# Patient Record
(demographics unavailable — no encounter records)

---

## 2024-10-23 NOTE — DATA REVIEWED
[de-identified] : Right wrist radiographs were obtained from [facility] and independently reviewed in clinic on 10/23/2024 depicting nondisplaced distal radius fracture with mild angulation noted which is acceptable alignment for his age. Signs of interval healing noted. Skeletally immature individual.
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
## 2024-10-23 NOTE — DATA REVIEWED
[de-identified] : Right wrist radiographs were obtained from [facility] and independently reviewed in clinic on 10/23/2024 depicting nondisplaced distal radius fracture with mild angulation noted which is acceptable alignment for his age. Signs of interval healing noted. Skeletally immature individual.
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
## 2024-10-23 NOTE — DEVELOPMENTAL MILESTONES
[Roll Over: ___ Months] : Roll Over: [unfilled] months [Sit Up: ___ Months] : Sit Up: [unfilled] months [Pull Self to Stand ___ Months] : Pull self to stand: [unfilled] months [Walk ___ Months] : Walk: [unfilled] months [Right] : right [FreeTextEntry2] : no
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
## 2024-10-23 NOTE — PHYSICAL EXAM
[FreeTextEntry1] : Gait: Presents ambulating independently without signs of antalgia. Good coordination and balance noted. GENERAL: alert, cooperative, in NAD SKIN: The skin is intact, warm, pink and dry over the area examined. EYES: Normal conjunctiva, normal eyelids and pupils were equal and round. ENT: normal ears, normal nose and normal lips. CARDIOVASCULAR: brisk capillary refill, but no peripheral edema. RESPIRATORY: The patient is in no apparent respiratory distress. They're taking full deep breaths without use of accessory muscles or evidence of audible wheezes or stridor without the use of a stethoscope. Normal respiratory effort. ABDOMEN: not examined  Right Upper Extremity: No bony deformities, inflammation or erythema No TTP over distal radius. Dorsal fullness noted. No other tenderness of bony prominences of soft tissue structures. No anatomical snuffbox tenderness. Full range of motion with extension, flexion, ulnar and radial deviation without stiffness. Able to perform a thumbs up maneuver (PIN), OK sign (AIN), finger crossover (ulnar). Able to fully supinate and pronate forearm. Fingers are warm, pink, and moving freely.  Radial pulse is +2 B/L. Brisk capillary refill in all 5 fingers.  Sensation is intact to light touch distally.  Nerve innervation of the hand is intact. 5/5 Strength.